# Patient Record
Sex: FEMALE | Race: WHITE | NOT HISPANIC OR LATINO | URBAN - NONMETROPOLITAN AREA
[De-identification: names, ages, dates, MRNs, and addresses within clinical notes are randomized per-mention and may not be internally consistent; named-entity substitution may affect disease eponyms.]

---

## 2019-03-07 ENCOUNTER — IMPORTED ENCOUNTER (OUTPATIENT)
Dept: URBAN - NONMETROPOLITAN AREA CLINIC 1 | Facility: CLINIC | Age: 79
End: 2019-03-07

## 2019-03-07 PROBLEM — E11.9: Noted: 2019-03-07

## 2019-03-07 PROBLEM — H26.491: Noted: 2019-03-07

## 2019-03-07 PROBLEM — H04.123: Noted: 2017-03-01

## 2019-03-07 PROBLEM — H52.13: Noted: 2017-03-01

## 2019-03-07 PROBLEM — Z96.1: Noted: 2019-03-07

## 2019-03-07 PROCEDURE — 92014 COMPRE OPH EXAM EST PT 1/>: CPT

## 2019-03-07 NOTE — PATIENT DISCUSSION
DM s -Stressed the importance of keeping blood sugars under control blood pressure under control and weight normalization and regular visits with PCP. -Explained the possible effects of poorly controlled diabetes and the damage that diabetes can cause to ocular health. -Patient to check HgbA1C.-Pt instructed to contact our office with any vision changes.-Last BS: unsure.-Last A1C: 5.8 in June. RTC 1 year DM Exam S/P CE w IOL OS 11/14/2016Pseudo OD 2005 Dr Abdullahi Burdick. Early PCO OU. -Explained symptoms of advancing PCO. -Continue to monitor for now. Pt will notify us if any new symptoms develop.; Dr's Notes: Pt. likes Myopia********Dr Allen treats AODM.

## 2019-03-20 ENCOUNTER — IMPORTED ENCOUNTER (OUTPATIENT)
Dept: URBAN - NONMETROPOLITAN AREA CLINIC 1 | Facility: CLINIC | Age: 79
End: 2019-03-20

## 2019-03-20 PROBLEM — Z96.1: Noted: 2019-03-20

## 2019-03-20 PROBLEM — E11.9: Noted: 2019-03-20

## 2019-03-20 PROBLEM — H26.491: Noted: 2019-03-20

## 2019-03-20 PROBLEM — H52.13: Noted: 2019-03-20

## 2019-03-20 PROBLEM — H04.123: Noted: 2019-03-20

## 2019-03-20 PROCEDURE — 92015 DETERMINE REFRACTIVE STATE: CPT

## 2019-03-20 NOTE — PATIENT DISCUSSION
Refraction Only-  discussed findings w/patient-  new spectacle Rx issued-  monitor as scheduled or prn DM s DR-Stressed the importance of keeping blood sugars under control blood pressure under control and weight normalization and regular visits with PCP. -Explained the possible effects of poorly controlled diabetes and the damage that diabetes can cause to ocular health. -Patient to check HgbA1C.-Pt instructed to contact our office with any vision changes.-Last BS: unsure.-Last A1C: 5.8 in June. RTC 1 year DM Exam S/P CE w IOL OS 11/14/2016Pseudo OD 2005 Dr Vaz Carry. Early PCO OU. -Explained symptoms of advancing PCO. -Continue to monitor for now. Pt will notify us if any new symptoms develop.; Dr's Notes: Pt. likes Myopia********Dr Allen treats AODM.

## 2020-03-10 ENCOUNTER — IMPORTED ENCOUNTER (OUTPATIENT)
Dept: URBAN - NONMETROPOLITAN AREA CLINIC 1 | Facility: CLINIC | Age: 80
End: 2020-03-10

## 2020-03-10 PROBLEM — Z96.1: Noted: 2020-03-10

## 2020-03-10 PROBLEM — E11.9: Noted: 2020-03-10

## 2020-03-10 PROBLEM — H26.493: Noted: 2020-03-10

## 2020-03-10 PROBLEM — H16.223: Noted: 2020-03-10

## 2020-03-10 PROCEDURE — 92015 DETERMINE REFRACTIVE STATE: CPT

## 2020-03-10 PROCEDURE — 92014 COMPRE OPH EXAM EST PT 1/>: CPT

## 2020-03-10 NOTE — PATIENT DISCUSSION
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 -  Pt instructed to contact our office with any vision changes.-  RTC 1 year or prnPseudophakia w/PCO OU-  discussed findings w/patient-  Explained symptoms of advancing PCO-  no treatment indicated at this time-  Continue to monitor for now.  Pt will notify us if any new symptoms develop.-  RTC 1 year or prnDES OU- discussed findings w/patient-  start AT's PRN OU-  RTC 1 year or prnCompound Myopic Astigmatism OU w/Presbyopia-  discussed findings w/patient-  new spectacle Rx issued-  continue to monitor yearly or prn; 's Notes: MR 3/10/2020DFE 3/10/2020

## 2020-03-27 PROBLEM — Z96.1: Noted: 2020-03-10

## 2020-03-27 PROBLEM — H16.223: Noted: 2020-03-10

## 2020-03-27 PROBLEM — H52.223: Noted: 2020-03-27

## 2020-03-27 PROBLEM — H52.13: Noted: 2020-03-27

## 2020-03-27 PROBLEM — E11.9: Noted: 2020-03-10

## 2020-03-27 PROBLEM — H52.4: Noted: 2020-03-27

## 2020-03-27 PROBLEM — H26.491: Noted: 2020-03-10

## 2021-03-16 ENCOUNTER — IMPORTED ENCOUNTER (OUTPATIENT)
Dept: URBAN - NONMETROPOLITAN AREA CLINIC 1 | Facility: CLINIC | Age: 81
End: 2021-03-16

## 2021-03-16 PROCEDURE — 92015 DETERMINE REFRACTIVE STATE: CPT

## 2021-03-16 PROCEDURE — 92014 COMPRE OPH EXAM EST PT 1/>: CPT

## 2022-04-10 ASSESSMENT — VISUAL ACUITY
OD_SC: 20/20
OD_SC: 20/25
OU_SC: 20/25
OS_SC: 20/20
OU_SC: 20/15
OS_SC: 20/30
OD_SC: 20/20
OU_CC: 20/20
OS_CC: J1+
OU_SC: 20/20
OD_CC: J1+
OU_CC: J1+
OS_SC: 20/20-1

## 2022-04-10 ASSESSMENT — TONOMETRY
OD_IOP_MMHG: 14
OD_IOP_MMHG: 13
OS_IOP_MMHG: 14
OS_IOP_MMHG: 13
OD_IOP_MMHG: 15
OS_IOP_MMHG: 15

## 2022-05-03 ENCOUNTER — COMPREHENSIVE EXAM (OUTPATIENT)
Dept: URBAN - NONMETROPOLITAN AREA CLINIC 4 | Facility: CLINIC | Age: 82
End: 2022-05-03

## 2022-05-03 DIAGNOSIS — H26.491: ICD-10-CM

## 2022-05-03 DIAGNOSIS — E11.9: ICD-10-CM

## 2022-05-03 DIAGNOSIS — H52.223: ICD-10-CM

## 2022-05-03 DIAGNOSIS — H52.13: ICD-10-CM

## 2022-05-03 PROCEDURE — 92014 COMPRE OPH EXAM EST PT 1/>: CPT

## 2022-05-03 PROCEDURE — 92015 DETERMINE REFRACTIVE STATE: CPT

## 2022-05-03 ASSESSMENT — TONOMETRY
OD_IOP_MMHG: 14
OS_IOP_MMHG: 15

## 2022-05-03 ASSESSMENT — VISUAL ACUITY
OD_CC: 20/25
OS_CC: 20/25-1

## 2023-05-04 ENCOUNTER — COMPREHENSIVE EXAM (OUTPATIENT)
Dept: URBAN - NONMETROPOLITAN AREA CLINIC 4 | Facility: CLINIC | Age: 83
End: 2023-05-04

## 2023-05-04 DIAGNOSIS — Z96.1: ICD-10-CM

## 2023-05-04 DIAGNOSIS — H52.4: ICD-10-CM

## 2023-05-04 DIAGNOSIS — H16.223: ICD-10-CM

## 2023-05-04 DIAGNOSIS — H10.45: ICD-10-CM

## 2023-05-04 DIAGNOSIS — H52.13: ICD-10-CM

## 2023-05-04 DIAGNOSIS — H26.491: ICD-10-CM

## 2023-05-04 DIAGNOSIS — E11.9: ICD-10-CM

## 2023-05-04 DIAGNOSIS — H52.223: ICD-10-CM

## 2023-05-04 PROCEDURE — 92015 DETERMINE REFRACTIVE STATE: CPT

## 2023-05-04 PROCEDURE — 92014 COMPRE OPH EXAM EST PT 1/>: CPT

## 2023-05-04 ASSESSMENT — VISUAL ACUITY
OS_CC: 20/25
OD_CC: 20/25-1
OS_SC: 20/70
OD_SC: 20/70

## 2023-05-04 ASSESSMENT — TONOMETRY
OD_IOP_MMHG: 10
OS_IOP_MMHG: 10

## 2024-08-02 ENCOUNTER — COMPREHENSIVE EXAM (OUTPATIENT)
Dept: URBAN - NONMETROPOLITAN AREA CLINIC 4 | Facility: CLINIC | Age: 84
End: 2024-08-02

## 2024-08-02 DIAGNOSIS — H52.223: ICD-10-CM

## 2024-08-02 DIAGNOSIS — E11.9: ICD-10-CM

## 2024-08-02 DIAGNOSIS — Z96.1: ICD-10-CM

## 2024-08-02 DIAGNOSIS — H52.13: ICD-10-CM

## 2024-08-02 DIAGNOSIS — H16.223: ICD-10-CM

## 2024-08-02 DIAGNOSIS — H43.813: ICD-10-CM

## 2024-08-02 DIAGNOSIS — H52.4: ICD-10-CM

## 2024-08-02 DIAGNOSIS — H26.493: ICD-10-CM

## 2024-08-02 DIAGNOSIS — H10.45: ICD-10-CM

## 2024-08-02 PROCEDURE — 92014 COMPRE OPH EXAM EST PT 1/>: CPT

## 2024-08-02 PROCEDURE — 92015 DETERMINE REFRACTIVE STATE: CPT

## 2024-08-02 ASSESSMENT — TONOMETRY
OD_IOP_MMHG: 16
OS_IOP_MMHG: 16

## 2024-08-02 ASSESSMENT — VISUAL ACUITY
OD_CC: 20/25
OS_CC: 20/20